# Patient Record
Sex: MALE | Race: WHITE | NOT HISPANIC OR LATINO | URBAN - METROPOLITAN AREA
[De-identification: names, ages, dates, MRNs, and addresses within clinical notes are randomized per-mention and may not be internally consistent; named-entity substitution may affect disease eponyms.]

---

## 2018-04-24 ENCOUNTER — TELEPHONE (OUTPATIENT)
Dept: OTHER | Facility: CLINIC | Age: 63
End: 2018-04-24

## 2018-04-24 NOTE — TELEPHONE ENCOUNTER
4/24/2018    Call Regarding Onboarding [facility] choices     Attempt 1    Message on voicemail    Comments: 1 spouse dep      Outreach   [place]

## 2018-09-13 ENCOUNTER — OFFICE VISIT - HEALTHEAST (OUTPATIENT)
Dept: FAMILY MEDICINE | Facility: CLINIC | Age: 63
End: 2018-09-13

## 2018-09-13 DIAGNOSIS — N52.9 ED (ERECTILE DYSFUNCTION): ICD-10-CM

## 2018-09-13 DIAGNOSIS — Z00.00 ROUTINE GENERAL MEDICAL EXAMINATION AT A HEALTH CARE FACILITY: ICD-10-CM

## 2018-09-13 DIAGNOSIS — I10 BENIGN ESSENTIAL HYPERTENSION: ICD-10-CM

## 2018-09-13 DIAGNOSIS — R10.13 DYSPEPSIA: ICD-10-CM

## 2018-09-13 DIAGNOSIS — E78.5 HYPERLIPIDEMIA: ICD-10-CM

## 2018-09-13 DIAGNOSIS — E66.9 OBESE: ICD-10-CM

## 2018-09-13 LAB
ALBUMIN SERPL-MCNC: 4.1 G/DL (ref 3.5–5)
ALP SERPL-CCNC: 64 U/L (ref 45–120)
ALT SERPL W P-5'-P-CCNC: 25 U/L (ref 0–45)
ANION GAP SERPL CALCULATED.3IONS-SCNC: 8 MMOL/L (ref 5–18)
AST SERPL W P-5'-P-CCNC: 19 U/L (ref 0–40)
BILIRUB SERPL-MCNC: 0.7 MG/DL (ref 0–1)
BUN SERPL-MCNC: 16 MG/DL (ref 8–22)
CALCIUM SERPL-MCNC: 9.3 MG/DL (ref 8.5–10.5)
CHLORIDE BLD-SCNC: 106 MMOL/L (ref 98–107)
CO2 SERPL-SCNC: 25 MMOL/L (ref 22–31)
CREAT SERPL-MCNC: 1 MG/DL (ref 0.7–1.3)
GFR SERPL CREATININE-BSD FRML MDRD: >60 ML/MIN/1.73M2
GLUCOSE BLD-MCNC: 91 MG/DL (ref 70–125)
HBA1C MFR BLD: 5.3 % (ref 3.5–6)
MAGNESIUM SERPL-MCNC: 2.2 MG/DL (ref 1.8–2.6)
POTASSIUM BLD-SCNC: 4.4 MMOL/L (ref 3.5–5)
PROT SERPL-MCNC: 6.7 G/DL (ref 6–8)
PSA SERPL-MCNC: 2.1 NG/ML (ref 0–4.5)
SODIUM SERPL-SCNC: 139 MMOL/L (ref 136–145)
VIT B12 SERPL-MCNC: 421 PG/ML (ref 213–816)

## 2018-09-13 ASSESSMENT — MIFFLIN-ST. JEOR: SCORE: 1867.51

## 2018-09-14 ENCOUNTER — COMMUNICATION - HEALTHEAST (OUTPATIENT)
Dept: FAMILY MEDICINE | Facility: CLINIC | Age: 63
End: 2018-09-14

## 2018-09-14 RX ORDER — SILDENAFIL 50 MG/1
50 TABLET, FILM COATED ORAL PRN
Qty: 20 TABLET | Refills: 0 | Status: SHIPPED | OUTPATIENT
Start: 2018-09-14

## 2019-09-27 ENCOUNTER — OFFICE VISIT - HEALTHEAST (OUTPATIENT)
Dept: FAMILY MEDICINE | Facility: CLINIC | Age: 64
End: 2019-09-27

## 2019-09-27 DIAGNOSIS — K21.9 GASTROESOPHAGEAL REFLUX DISEASE WITHOUT ESOPHAGITIS: ICD-10-CM

## 2019-09-27 DIAGNOSIS — K57.30 DIVERTICULOSIS OF LARGE INTESTINE WITHOUT HEMORRHAGE: ICD-10-CM

## 2019-09-27 DIAGNOSIS — Z00.00 ROUTINE GENERAL MEDICAL EXAMINATION AT A HEALTH CARE FACILITY: ICD-10-CM

## 2019-09-27 DIAGNOSIS — I10 BENIGN ESSENTIAL HYPERTENSION: ICD-10-CM

## 2019-09-27 DIAGNOSIS — Z12.11 SCREEN FOR COLON CANCER: ICD-10-CM

## 2019-09-27 DIAGNOSIS — N52.9 ERECTILE DYSFUNCTION, UNSPECIFIED ERECTILE DYSFUNCTION TYPE: ICD-10-CM

## 2019-09-27 DIAGNOSIS — Z23 NEED FOR IMMUNIZATION AGAINST INFLUENZA: ICD-10-CM

## 2019-09-27 DIAGNOSIS — E78.5 HYPERLIPIDEMIA, UNSPECIFIED HYPERLIPIDEMIA TYPE: ICD-10-CM

## 2019-09-27 LAB
ALBUMIN SERPL-MCNC: 4.1 G/DL (ref 3.5–5)
ALP SERPL-CCNC: 66 U/L (ref 45–120)
ALT SERPL W P-5'-P-CCNC: 23 U/L (ref 0–45)
ANION GAP SERPL CALCULATED.3IONS-SCNC: 9 MMOL/L (ref 5–18)
AST SERPL W P-5'-P-CCNC: 19 U/L (ref 0–40)
BILIRUB DIRECT SERPL-MCNC: 0.2 MG/DL
BILIRUB SERPL-MCNC: 0.6 MG/DL (ref 0–1)
BUN SERPL-MCNC: 12 MG/DL (ref 8–22)
CALCIUM SERPL-MCNC: 8.9 MG/DL (ref 8.5–10.5)
CHLORIDE BLD-SCNC: 109 MMOL/L (ref 98–107)
CHOLEST SERPL-MCNC: 182 MG/DL
CO2 SERPL-SCNC: 25 MMOL/L (ref 22–31)
CREAT SERPL-MCNC: 1.02 MG/DL (ref 0.7–1.3)
ERYTHROCYTE [DISTWIDTH] IN BLOOD BY AUTOMATED COUNT: 11.5 % (ref 11–14.5)
FASTING STATUS PATIENT QL REPORTED: YES
GFR SERPL CREATININE-BSD FRML MDRD: >60 ML/MIN/1.73M2
GLUCOSE BLD-MCNC: 88 MG/DL (ref 70–125)
HCT VFR BLD AUTO: 42.4 % (ref 40–54)
HDLC SERPL-MCNC: 53 MG/DL
HGB BLD-MCNC: 14.6 G/DL (ref 14–18)
LDLC SERPL CALC-MCNC: 98 MG/DL
MCH RBC QN AUTO: 30.5 PG (ref 27–34)
MCHC RBC AUTO-ENTMCNC: 34.4 G/DL (ref 32–36)
MCV RBC AUTO: 89 FL (ref 80–100)
PLATELET # BLD AUTO: 205 THOU/UL (ref 140–440)
PMV BLD AUTO: 7.9 FL (ref 7–10)
POTASSIUM BLD-SCNC: 4.5 MMOL/L (ref 3.5–5)
PROT SERPL-MCNC: 6.7 G/DL (ref 6–8)
PSA SERPL-MCNC: 3.1 NG/ML (ref 0–4.5)
RBC # BLD AUTO: 4.78 MILL/UL (ref 4.4–6.2)
SODIUM SERPL-SCNC: 143 MMOL/L (ref 136–145)
TRIGL SERPL-MCNC: 156 MG/DL
WBC: 6.4 THOU/UL (ref 4–11)

## 2019-09-27 RX ORDER — SIMVASTATIN 40 MG
40 TABLET ORAL AT BEDTIME
Qty: 90 TABLET | Refills: 3 | Status: SHIPPED | OUTPATIENT
Start: 2019-09-27

## 2019-09-27 ASSESSMENT — MIFFLIN-ST. JEOR: SCORE: 1885.08

## 2019-10-25 ENCOUNTER — COMMUNICATION - HEALTHEAST (OUTPATIENT)
Dept: FAMILY MEDICINE | Facility: CLINIC | Age: 64
End: 2019-10-25

## 2019-10-25 DIAGNOSIS — K21.9 GASTROESOPHAGEAL REFLUX DISEASE WITHOUT ESOPHAGITIS: ICD-10-CM

## 2020-07-05 ENCOUNTER — COMMUNICATION - HEALTHEAST (OUTPATIENT)
Dept: FAMILY MEDICINE | Facility: CLINIC | Age: 65
End: 2020-07-05

## 2020-07-05 DIAGNOSIS — I10 BENIGN ESSENTIAL HYPERTENSION: ICD-10-CM

## 2020-07-05 RX ORDER — LISINOPRIL 5 MG/1
TABLET ORAL
Qty: 90 TABLET | Refills: 0 | Status: SHIPPED | OUTPATIENT
Start: 2020-07-05

## 2021-06-01 VITALS — HEIGHT: 71 IN | BODY MASS INDEX: 32.76 KG/M2 | WEIGHT: 234 LBS

## 2021-06-01 NOTE — PROGRESS NOTES
Assessment/ Plan     1. Routine general medical examination at a health care facility    Recommend that he remain physically active  Check a PSA  - PSA (Prostatic-Specific Antigen), Annual Screen    Influenza vaccine was given  Reviewed the Shingrix/shingles vaccine    2. Screen for colon cancer    His most recent colonoscopy was in October 2016.  This revealed diverticulosis but was otherwise normal.  Recommend rechecking in October 2026 per the recommendation    3. Need for immunization against influenza    Influenza vaccine given  - Influenza, Recombinant, Inj, Quadrivalent, PF, 18+YRS    4. Benign essential hypertension    Continue lisinopril  Check a basic metabolic panel  Recommend continue to work on dietary lifestyle modifications  Favor weight loss  - Basic Metabolic Panel  - HM2(CBC w/o Differential)    5. Hyperlipidemia, unspecified hyperlipidemia type    Continue simvastatin  Check a lipid cascade and hepatic profile  - Hepatic Profile  - Lipid Cascade    6. Gastroesophageal reflux disease without esophagitis    He takes omeprazole as needed  We will check a basic metabolic panel  Discussed dietary modifications    7. Erectile dysfunction, unspecified erectile dysfunction type    He may take Viagra as needed    8. Diverticulosis of large intestine without hemorrhage    Reviewed findings from his colonoscopy from 2016    9.  Left foot bunion    Follow-up with podiatry as needed      Subjective:       Baldo Eduardo is a 63 y.o. male who presents to the clinic for a complete physical examination.    Medical history is notable for hypertension and hyperlipidemia.  He continues to take lisinopril and also takes simvastatin.  He has tolerated his medications without difficulty.    Additionally, he has a history of esophageal reflux symptoms.  He is able to manage some of his symptoms with his dietary changes.  He will take omeprazole every other day as needed.    Medical history is otherwise notable for erectile  dysfunction.  He will take Viagra which has been helpful.  Also, he has a history of bilateral bunions.  He has had foot surgery for his right foot for a bunion and will have surgery in the future for his left foot.  He has had cataract surgery as well.    He did have a colonoscopy in 2016 and this revealed diverticulosis but was otherwise normal.    He has been compliant with his medications.  He states that he walks 3-4 miles most days.  He has not had chest pain, shortness of breath, bowel changes, or urinary concerns.  He does not have nighttime urination problems.    Social history is notable for the fact that he is  and has 2 grown children.  He is retired and previously worked as an  for the [employer].  He spends the winter months in [place]    He does note that he may have had one pneumonia vaccine though that is unclear.    Family history is notable for a father who had a history of heart disease with a heart attack when he was 40  at the age of 41.    The following portions of the patient's history were reviewed and updated as appropriate: allergies, current medications, past family history, past medical history, past social history, past surgical history and problem list. Medications have been reconciled    Review of Systems   A 12 point comprehensive review of systems was negative except as noted.      Current Outpatient Medications   Medication Sig Dispense Refill     aspirin 81 MG EC tablet Take 1 tab by mouth every other day for prevention       lisinopril (PRINIVIL,ZESTRIL) 5 MG tablet Take 1 tablet (5 mg total) by mouth daily. 90 tablet 3     MULTIVITAMIN ORAL Take by mouth.       omega-3 fatty acids-fish oil 360-1,200 mg cap Take 1 capsule by mouth.       omeprazole (PRILOSEC) 20 MG capsule Take 1 capsule (20 mg total) by mouth daily before breakfast. (Patient taking differently: Take 20 mg by mouth every other day.       ) 90 capsule 3     sildenafil (VIAGRA) 50  "MG tablet Take 1 tablet (50 mg total) by mouth as needed for erectile dysfunction. 20 tablet 0     simvastatin (ZOCOR) 40 MG tablet Take 1 tablet (40 mg total) by mouth at bedtime. 90 tablet 3     No current facility-administered medications for this visit.        Objective:      /88   Pulse (!) 52   Resp 16   Ht 5' 11.5\" (1.816 m)   Wt (!) 237 lb (107.5 kg)   BMI 32.59 kg/m        General appearance: alert, appears stated age and cooperative  Head: Normocephalic, without obvious abnormality, atraumatic  Alopecia noted  Eyes: conjunctivae/corneas clear. PERRL, EOM's intact.   He wears glasses  Ears: normal TM's and external ear canals both ears  Nose: Nares normal. Septum midline. Mucosa normal. No drainage or sinus tenderness.  Throat: lips, mucosa, and tongue normal; teeth and gums normal  Neck: no adenopathy, supple, symmetrical, trachea midline and thyroid not enlarged, symmetric, no tenderness/mass/nodules  Back: symmetric, no curvature. ROM normal. No CVA tenderness.  Lungs: clear to auscultation bilaterally  Heart: regular rate and rhythm, S1, S2 normal, no murmur, click, rub or gallop  Abdomen: soft, non-tender; bowel sounds normal; no masses,  no organomegaly  Genitourinary: Penis is circumcised, no scrotal masses, no inguinal hernia  Rectal: Normal sphincter tone, prostate smooth and symmetric and moderately enlarged  Extremities: extremities normal, atraumatic, no cyanosis or edema  He has a significant left foot bunion with some overlap of his toes  Skin: Skin color, texture, turgor normal.  He has multiple flesh-colored and brownish lesions involving the scalp and temple area  Lymph nodes: Cervical nodes normal.  Neurologic: Alert and oriented X 3. Normal coordination and gait         No results found for this or any previous visit (from the past 168 hour(s)).       This note has been dictated using voice recognition software. Any grammatical or context distortions are unintentional and " inherent to the software

## 2021-06-01 NOTE — PATIENT INSTRUCTIONS - HE
Remained physically active  Continue to work on dietary changes.  Limit carbohydrates in your diet  You received the flu shot  Consider the Shingrix/shingles vaccine.  You can check with insurance regarding coverage  Your colonoscopy was in October 2016.  Continue current medications

## 2021-06-02 NOTE — TELEPHONE ENCOUNTER
RN cannot approve Refill Request    RN can NOT refill this medication pt reports takes every other day not daily.       Kimberly Seals, [facility] Triage/Med Refill 10/25/2019    Requested Prescriptions   Pending Prescriptions Disp Refills     omeprazole (PRILOSEC) 20 MG capsule [Pharmacy Med Name: OMEPRAZOLE DR 20 MG CAPSULE] 90 capsule 1     Sig: TAKE 1 CAPSULE (20 MG TOTAL) BY MOUTH DAILY BEFORE BREAKFAST.       GI Medications Refill Protocol Passed - 10/25/2019  1:53 AM        Passed - PCP or prescribing provider visit in last 12 or next 3 months.     Last office visit with prescriber/PCP: Visit date not found OR same dept: Visit date not found OR same specialty: Visit date not found  Last physical: 9/13/2018 Last MTM visit: Visit date not found   Next visit within 3 mo: Visit date not found  Next physical within 3 mo: Visit date not found  Prescriber OR PCP: Garth Goldstein MD  Last diagnosis associated with med order: There are no diagnoses linked to this encounter.  If protocol passes may refill for 12 months if within 3 months of last provider visit (or a total of 15 months).

## 2021-06-03 VITALS
RESPIRATION RATE: 16 BRPM | WEIGHT: 237 LBS | BODY MASS INDEX: 32.1 KG/M2 | HEIGHT: 72 IN | SYSTOLIC BLOOD PRESSURE: 132 MMHG | DIASTOLIC BLOOD PRESSURE: 88 MMHG | HEART RATE: 52 BPM

## 2021-06-09 NOTE — TELEPHONE ENCOUNTER
Refill Approved    Rx renewed per Medication Renewal Policy. Medication was last renewed on 09/27/2019.  Last office visit was 09/27/2019 with PCP. Dr Perez.  Note sent to pharmacy. Due for appointment.    Bianca Enriquez, [facility] Triage/Med Refill 7/5/2020     Requested Prescriptions   Pending Prescriptions Disp Refills     lisinopriL (PRINIVIL,ZESTRIL) 5 MG tablet [Pharmacy Med Name: LISINOPRIL 5MG TABLETS] 90 tablet 3     Sig: TAKE 1 TABLET BY MOUTH EVERY DAY       [facility] Inhibitors Refill Protocol Passed - 7/5/2020  3:53 PM        Passed - PCP or prescribing provider visit in past 12 months       Last office visit with prescriber/PCP: Visit date not found OR same dept: Visit date not found OR same specialty: Visit date not found  Last physical: 9/27/2019 Last MTM visit: Visit date not found   Next visit within 3 mo: Visit date not found  Next physical within 3 mo: Visit date not found  Prescriber OR PCP: Adrian Fine MD  Last diagnosis associated with med order: 1. Benign essential hypertension  - lisinopriL (PRINIVIL,ZESTRIL) 5 MG tablet [Pharmacy Med Name: LISINOPRIL 5MG TABLETS]; TAKE 1 TABLET BY MOUTH EVERY DAY  Dispense: 90 tablet; Refill: 3    If protocol passes may refill for 12 months if within 3 months of last provider visit (or a total of 15 months).             Passed - Serum Potassium in past 12 months     Lab Results   Component Value Date    Potassium 4.5 09/27/2019             Passed - Blood pressure filed in past 12 months     BP Readings from Last 1 Encounters:   09/27/19 132/88             Passed - Serum Creatinine in past 12 months     Creatinine   Date Value Ref Range Status   09/27/2019 1.02 0.70 - 1.30 mg/dL Final

## 2021-06-16 PROBLEM — E66.9 OBESE: Status: ACTIVE | Noted: 2018-09-13

## 2021-06-16 PROBLEM — K57.30 DIVERTICULOSIS OF LARGE INTESTINE: Status: ACTIVE | Noted: 2019-09-27

## 2021-06-16 PROBLEM — I10 BENIGN ESSENTIAL HYPERTENSION: Status: ACTIVE | Noted: 2018-09-13

## 2021-06-16 PROBLEM — N52.9 ED (ERECTILE DYSFUNCTION): Status: ACTIVE | Noted: 2018-09-13

## 2021-06-16 PROBLEM — E78.5 HYPERLIPIDEMIA: Status: ACTIVE | Noted: 2018-09-13

## 2021-06-16 PROBLEM — K21.9 GASTROESOPHAGEAL REFLUX DISEASE WITHOUT ESOPHAGITIS: Status: ACTIVE | Noted: 2019-09-27

## 2021-06-20 NOTE — PROGRESS NOTES
[facility] Clinic Note    Patient Name: Baldo Eduardo  Patient Age: 62 y.o.  YOB: 1955  MRN: [id number]    Date of visit: 9/13/2018    Patient Active Problem List   Diagnosis     Benign essential hypertension     Hyperlipidemia     Dyspepsia     ED (erectile dysfunction)     Social History     Social History Narrative     No narrative on file     No family history on file.  No past surgical history on file.  Outpatient Encounter Prescriptions as of 9/13/2018   Medication Sig Dispense Refill     aspirin 81 MG EC tablet Take 1 tab by mouth every other day for prevention       lisinopril (PRINIVIL,ZESTRIL) 5 MG tablet Take 1 tablet (5 mg total) by mouth daily. 90 tablet 3     MULTIVITAMIN ORAL Take by mouth.       omega-3 fatty acids-fish oil 360-1,200 mg cap Take 1 capsule by mouth.       omeprazole (PRILOSEC) 20 MG capsule Take 1 capsule (20 mg total) by mouth daily before breakfast. 90 capsule 3     simvastatin (ZOCOR) 40 MG tablet Take 1 tablet (40 mg total) by mouth at bedtime. 90 tablet 3     tadalafil (CIALIS) 10 MG tablet Take 1 tablet (10 mg total) by mouth daily as needed for erectile dysfunction. 30 tablet 1     [DISCONTINUED] lisinopril (PRINIVIL,ZESTRIL) 5 MG tablet Take 5 mg by mouth.       [DISCONTINUED] omeprazole (PRILOSEC) 20 MG capsule Take by mouth.       [DISCONTINUED] simvastatin (ZOCOR) 40 MG tablet Take 40 mg by mouth.       [DISCONTINUED] tadalafil (CIALIS) 10 MG tablet Take 10 mg by mouth.       No facility-administered encounter medications on file as of 9/13/2018.        Chief Complaint:   Chief Complaint   Patient presents with     Annual Exam       HPI:   Occupation:retired   Marital status:y  Number of children:2  Living situation:2  Diet:needs work  Exercise:yes  Alcohol use:no  Tobacco use:cigar once in awhile  Illicit drug use:no  Depression:no  Last tetanus:utd  Blood pressure:ok  Mother/Father/Siblings MI:father 48yo and his parents.  Fam hx colon or prostate  "cancer or kidney disease:no    Last visit to dentist:y  Optometrist:y  No urinary sx    Chest pain and/or shortness of breath with exertion:no      bp is usually 115/76.          Wt Readings from Last 3 Encounters:   09/13/18 (!) 234 lb (106.1 kg)     BP Readings from Last 3 Encounters:   09/13/18 118/78       ROS: Pertinent ros findings in hpi, all other systems negative.  Objective/Physical Exam:     /78  Pulse (!) 48  Temp 97.9  F (36.6  C) (Oral)   Resp 12  Ht 5' 11.25\" (1.81 m)  Wt (!) 234 lb (106.1 kg)  BMI 32.41 kg/m2  p60  Gen: NAD, conversant, appears age, well-kempt  Skin: warm, dry, no rash, pallor cyanosis  HENT: normocephalic atraumatic, MMM, no oral lesions, otorrhea, rhinorrhea. TM's normal bilaterally.  Eyes: non-icteric, extra-ocular movements intact, PERRL, conjunctivae not injected. Holding eyes open comfortably, no drainage.  CV: NRRR no m/r/g, no peripheral edema. no JVD.  Resp: CTAB no w/r/r, normal respiratory effort  GI: soft, non-tender, non-distended. No masses.  MSK: no muscle or joint swelling.  Neuro: no dysarthria or gross asymmetry  Psych: full affect, oriented x 3  Lymph: No significant cervical lymphadenopathy  Hematologic: No petechiae or purpura.      Normal slight enlarged prostate on exam    Assessment/Plan:  No results found for this or any previous visit (from the past 24 hour(s)).    Wt Readings from Last 3 Encounters:   09/13/18 (!) 234 lb (106.1 kg)     BP Readings from Last 3 Encounters:   09/13/18 118/78       PHQ-2 Total Score: 0 (9/13/2018  7:14 AM)  No Data Recorded            ICD-10-CM    1. Hyperlipidemia E78.5    2. Benign essential hypertension I10 Comprehensive Metabolic Panel   3. Dyspepsia R10.13    4. ED (erectile dysfunction) N52.9    5. Routine general medical examination at a health care facility Z00.00 Glycosylated Hemoglobin A1c     Comprehensive Metabolic Panel     Magnesium     Vitamin B12     PSA, Annual Screen (Prostatic-Specific Antigen) "     Medications Ordered   Medications     lisinopril (PRINIVIL,ZESTRIL) 5 MG tablet     Sig: Take 1 tablet (5 mg total) by mouth daily.     Dispense:  90 tablet     Refill:  3     omeprazole (PRILOSEC) 20 MG capsule     Sig: Take 1 capsule (20 mg total) by mouth daily before breakfast.     Dispense:  90 capsule     Refill:  3     simvastatin (ZOCOR) 40 MG tablet     Sig: Take 1 tablet (40 mg total) by mouth at bedtime.     Dispense:  90 tablet     Refill:  3     tadalafil (CIALIS) 10 MG tablet     Sig: Take 1 tablet (10 mg total) by mouth daily as needed for erectile dysfunction.     Dispense:  30 tablet     Refill:  1     I did refill his medications, I did recommend that he stop taking fish oil because this is probably not benefiting him.  We did discuss risks and benefits for prostate cancer screening he would like to do this and so I am ordering a PSA.  We discussed lifestyle changes.  He has  been tested for HIV and hepatitis C in the past.    Patient Instructions   Plant Based Diet Handout    Eat abundant vegetables.    Only eat whole grains.    2-4 fruits/day.    Enjoy at least 2 servings of legumes (beans) or tofu daily.      Avoid animal products such as dairy, eggs and meat. (Replace these with 1,000mcg vitamin B12 and a calcium/vitamin D supplement such as Caltrate+D3 daily.)    Avoid processed foods, sugars and oils.    Cook your own food as much as possible.    Keep a food diary and ask someone else to diet with you.    Drink 8 glasses of water a day.    [place] [employer] Documentary - watch online.    Shingrix, flu vaccine      Counseled patient regarding healthy lifestyle including exercise, healthy eating. I recommend seeing optometrist and dentist regularly.    Counseled patient regarding treatments, treatment options, risks and benefits and diagnosis.  The patient was interactive, attentive, verbalized understanding, and we discussed plan.     Garth Goldstein MD

## 2021-07-03 NOTE — ADDENDUM NOTE
Addendum Note by Leena Carcamo MD at 9/27/2019  8:20 AM     Author: Leena Carcamo MD Service: -- Author Type: Physician    Filed: 9/27/2019  9:16 AM Encounter Date: 9/27/2019 Status: Signed    : Leena Carcamo MD (Physician)    Addended by: LEENA CARCAMO on: 9/27/2019 09:16 AM        Modules accepted: Orders

## 2021-08-14 ENCOUNTER — HEALTH MAINTENANCE LETTER (OUTPATIENT)
Age: 66
End: 2021-08-14

## 2021-10-10 ENCOUNTER — HEALTH MAINTENANCE LETTER (OUTPATIENT)
Age: 66
End: 2021-10-10

## 2022-09-18 ENCOUNTER — HEALTH MAINTENANCE LETTER (OUTPATIENT)
Age: 67
End: 2022-09-18

## 2023-10-08 ENCOUNTER — HEALTH MAINTENANCE LETTER (OUTPATIENT)
Age: 68
End: 2023-10-08

## 2024-02-15 ENCOUNTER — APPOINTMENT (RX ONLY)
Dept: URBAN - METROPOLITAN AREA CLINIC 116 | Facility: CLINIC | Age: 69
Setting detail: DERMATOLOGY
End: 2024-02-15

## 2024-02-15 DIAGNOSIS — Z48.02 ENCOUNTER FOR REMOVAL OF SUTURES: ICD-10-CM

## 2024-02-15 PROCEDURE — 99202 OFFICE O/P NEW SF 15 MIN: CPT

## 2024-02-15 PROCEDURE — ? PHOTO-DOCUMENTATION

## 2024-02-15 PROCEDURE — ? SUTURE REMOVAL (NO GLOBAL PERIOD)

## 2024-02-15 PROCEDURE — ? COUNSELING

## 2024-02-15 ASSESSMENT — LOCATION DETAILED DESCRIPTION DERM: LOCATION DETAILED: RIGHT LATERAL FRONTAL SCALP

## 2024-02-15 ASSESSMENT — LOCATION ZONE DERM: LOCATION ZONE: SCALP

## 2024-02-15 ASSESSMENT — LOCATION SIMPLE DESCRIPTION DERM: LOCATION SIMPLE: RIGHT SCALP
